# Patient Record
Sex: MALE | Race: WHITE | Employment: UNEMPLOYED | RURAL
[De-identification: names, ages, dates, MRNs, and addresses within clinical notes are randomized per-mention and may not be internally consistent; named-entity substitution may affect disease eponyms.]

---

## 2018-04-16 ENCOUNTER — HOSPITAL ENCOUNTER (OUTPATIENT)
Dept: OTHER | Age: 48
Discharge: HOME | End: 2018-04-16
Attending: NURSE PRACTITIONER | Admitting: NURSE PRACTITIONER

## 2018-04-16 LAB
A/G RATIO: 1.8 (ref 0.8–2)
ALBUMIN SERPL-MCNC: 4.8 G/DL (ref 3.4–4.8)
ALP BLD-CCNC: 81 U/L (ref 25–100)
ALT SERPL-CCNC: 19 U/L (ref 4–36)
ANION GAP SERPL CALCULATED.3IONS-SCNC: 13 MMOL/L (ref 3–16)
AST SERPL-CCNC: 19 U/L (ref 8–33)
BASOPHILS ABSOLUTE: 0.1 K/UL (ref 0–0.1)
BASOPHILS RELATIVE PERCENT: 0.9 %
BILIRUB SERPL-MCNC: <0.2 MG/DL (ref 0.3–1.2)
BUN BLDV-MCNC: 19 MG/DL (ref 6–20)
CALCIUM SERPL-MCNC: 9.6 MG/DL (ref 8.5–10.5)
CHLORIDE BLD-SCNC: 101 MMOL/L (ref 98–107)
CHOLESTEROL, TOTAL: 227 MG/DL (ref 0–200)
CO2: 28 MMOL/L (ref 20–30)
CREAT SERPL-MCNC: 0.9 MG/DL (ref 0.4–1.2)
EOSINOPHILS ABSOLUTE: 0.1 K/UL (ref 0–0.4)
EOSINOPHILS RELATIVE PERCENT: 1.7 %
GFR AFRICAN AMERICAN: >59
GFR NON-AFRICAN AMERICAN: >59
GLOBULIN: 2.7 G/DL
GLUCOSE BLD-MCNC: 116 MG/DL (ref 74–106)
HAV IGM SER IA-ACNC: NORMAL
HCT VFR BLD CALC: 46.6 % (ref 40–54)
HDLC SERPL-MCNC: 41 MG/DL (ref 40–60)
HEMOGLOBIN: 14.9 G/DL (ref 13–18)
HEPATITIS B CORE IGM ANTIBODY: NORMAL
HEPATITIS B SURFACE ANTIGEN INTERPRETATION: NORMAL
HEPATITIS C ANTIBODY INTERPRETATION: NORMAL
IMMATURE GRANULOCYTES #: 0.2 K/UL
IMMATURE GRANULOCYTES %: 2 % (ref 0–5)
LDL CHOLESTEROL CALCULATED: 163 MG/DL
LYMPHOCYTES ABSOLUTE: 2 K/UL (ref 1.5–4)
LYMPHOCYTES RELATIVE PERCENT: 25.8 %
MCH RBC QN AUTO: 29.9 PG (ref 27–32)
MCHC RBC AUTO-ENTMCNC: 32 G/DL (ref 31–35)
MCV RBC AUTO: 93.4 FL (ref 80–100)
MONOCYTES ABSOLUTE: 0.6 K/UL (ref 0.2–0.8)
MONOCYTES RELATIVE PERCENT: 8.2 %
NEUTROPHILS ABSOLUTE: 4.7 K/UL (ref 2–7.5)
NEUTROPHILS RELATIVE PERCENT: 61.4 %
PDW BLD-RTO: 12.8 % (ref 11–16)
PLATELET # BLD: 332 K/UL (ref 150–400)
PMV BLD AUTO: 10.7 FL (ref 6–10)
POTASSIUM SERPL-SCNC: 4.8 MMOL/L (ref 3.4–5.1)
RBC # BLD: 4.99 M/UL (ref 4.5–6)
SODIUM BLD-SCNC: 142 MMOL/L (ref 136–145)
TOTAL PROTEIN: 7.5 G/DL (ref 6.4–8.3)
TRIGL SERPL-MCNC: 117 MG/DL (ref 0–249)
VLDLC SERPL CALC-MCNC: 23 MG/DL
WBC # BLD: 7.7 K/UL (ref 4–11)

## 2018-11-13 ENCOUNTER — HOSPITAL ENCOUNTER (OUTPATIENT)
Facility: HOSPITAL | Age: 48
Discharge: HOME | End: 2018-11-13
Payer: MEDICAID

## 2018-11-13 ENCOUNTER — HOSPITAL ENCOUNTER (OUTPATIENT)
Dept: GENERAL RADIOLOGY | Facility: HOSPITAL | Age: 48
Discharge: HOME | End: 2018-11-13
Payer: MEDICAID

## 2018-11-13 DIAGNOSIS — M79.671 RIGHT FOOT PAIN: ICD-10-CM

## 2018-11-13 PROCEDURE — 73630 X-RAY EXAM OF FOOT: CPT

## 2018-11-30 DIAGNOSIS — M25.571 ARTHRALGIA OF RIGHT FOOT: Primary | ICD-10-CM

## 2018-12-03 ENCOUNTER — OFFICE VISIT (OUTPATIENT)
Dept: ORTHOPEDIC SURGERY | Facility: CLINIC | Age: 48
End: 2018-12-03

## 2018-12-03 VITALS — WEIGHT: 210 LBS | HEIGHT: 72 IN | BODY MASS INDEX: 28.44 KG/M2 | RESPIRATION RATE: 18 BRPM

## 2018-12-03 DIAGNOSIS — M20.21 HALLUX RIGIDUS OF RIGHT FOOT: Primary | ICD-10-CM

## 2018-12-03 PROCEDURE — 99203 OFFICE O/P NEW LOW 30 MIN: CPT | Performed by: PODIATRIST

## 2018-12-03 RX ORDER — IBUPROFEN 800 MG/1
800 TABLET ORAL 3 TIMES DAILY
Qty: 90 TABLET | Refills: 0 | Status: SHIPPED | OUTPATIENT
Start: 2018-12-03 | End: 2020-09-01
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
## 2019-01-03 ENCOUNTER — OFFICE VISIT (OUTPATIENT)
Dept: ORTHOPEDIC SURGERY | Facility: CLINIC | Age: 49
End: 2019-01-03

## 2019-01-03 VITALS — BODY MASS INDEX: 29.12 KG/M2 | WEIGHT: 215 LBS | RESPIRATION RATE: 18 BRPM | HEIGHT: 72 IN

## 2019-01-03 DIAGNOSIS — M20.21 HALLUX RIGIDUS OF RIGHT FOOT: Primary | ICD-10-CM

## 2019-01-03 DIAGNOSIS — M25.571 ARTHRALGIA OF RIGHT FOOT: ICD-10-CM

## 2019-01-03 PROCEDURE — 99213 OFFICE O/P EST LOW 20 MIN: CPT | Performed by: PODIATRIST

## 2019-01-03 NOTE — PROGRESS NOTES
Subjective   Patient ID: Alek Farrell is a 48 y.o. male   Follow-up of the Right Foot (right hallux limitus)  He comes back in today stating that he was doing pretty good but over the last few days he noticed some slight increase in pain.  He is only taking oral anti-inflammatories on occasion and states he stopped using his gel recently.  Says he's been walking around barefoot at some    History of Present Illness                                                   Review of Systems   Constitutional: Negative.    Respiratory: Negative.    Musculoskeletal: Positive for arthralgias (right foot).   Skin: Negative.        Past Medical History:   Diagnosis Date   • Fracture, foot     right foot        Past Surgical History:   Procedure Laterality Date   • CYST REMOVAL         No Known Allergies      Current Outpatient Medications:   •  diclofenac (VOLTAREN) 1 % gel gel, Apply 4 g topically to the appropriate area as directed 4 (Four) Times a Day., Disp: 100 g, Rfl: 1  •  ibuprofen (ADVIL,MOTRIN) 800 MG tablet, Take 1 tablet by mouth 3 (Three) Times a Day., Disp: 90 tablet, Rfl: 0    Family History   Problem Relation Age of Onset   • Diabetes Brother        Social History     Socioeconomic History   • Marital status: Single     Spouse name: Not on file   • Number of children: Not on file   • Years of education: Not on file   • Highest education level: Not on file   Social Needs   • Financial resource strain: Not on file   • Food insecurity - worry: Not on file   • Food insecurity - inability: Not on file   • Transportation needs - medical: Not on file   • Transportation needs - non-medical: Not on file   Occupational History   • Not on file   Tobacco Use   • Smoking status: Never Smoker   • Smokeless tobacco: Current User     Types: Chew   Substance and Sexual Activity   • Alcohol use: No     Frequency: Never   • Drug use: No   • Sexual activity: Defer   Other Topics Concern   • Not on file   Social History Narrative    • Not on file       Ready to quit: No  Counseling given: Yes       I have reviewed all of the above social hx, family hx, surgical hx, medications, allergies & ROS and confirm that it is accurate.  Objective   Physical Exam  Ortho Exam  Ortho Exam right  Lower extremity exam:  Vascular: Pulses palpable, pedal hair noted, CFT brisk, no edema noted  Neuro: Gross sensation intact  Derm: Normal turgor and temperature, no wounds or sores or lesions  MSK: There is tenderness to palpation to the right first metatarsal plantar joint.  There is a bony ridge and prominence noted dorsally and medially.  There is pain with end range of motion dorsiflexion.  Unloaded there is roughly 30° of dorsiflexion.  With the foot loaded this decreases to about 15.    Assessment/Plan right hallux limitus  Independent Review of Radiographic Studies:      Laboratory and Other Studies:     Medical Decision Making:        Procedures  [] No procedures were performed in office today.    There are no diagnoses linked to this encounter.      Recommendations/Plan:  We discussed continuing good supportive shoe gear and not going barefooted.  I recommend he continue the topical gel.  I will check on getting him set up for custom orthotics.  Hopefully these can be ordered in the near future.  We discussed injections.  He wants to get back to running and I'm okay with him trying that I recommend he wear good shoes and uses medication.  I'll notify him as soon as we find out about the orthotics.    No Follow-up on file.  Patient agreeable to call or return sooner for any concerns.

## 2020-01-30 ENCOUNTER — HOSPITAL ENCOUNTER (OUTPATIENT)
Facility: HOSPITAL | Age: 50
Discharge: HOME | End: 2020-01-30
Payer: MEDICAID

## 2020-01-30 PROCEDURE — 87591 N.GONORRHOEAE DNA AMP PROB: CPT

## 2020-01-30 PROCEDURE — 87491 CHLMYD TRACH DNA AMP PROBE: CPT

## 2020-01-31 LAB
C. TRACHOMATIS DNA ,URINE: NEGATIVE
N. GONORRHOEAE DNA, URINE: NEGATIVE

## 2020-07-23 ENCOUNTER — OFFICE VISIT (OUTPATIENT)
Dept: SURGERY | Facility: CLINIC | Age: 50
End: 2020-07-23

## 2020-07-23 VITALS
SYSTOLIC BLOOD PRESSURE: 138 MMHG | HEART RATE: 78 BPM | DIASTOLIC BLOOD PRESSURE: 88 MMHG | TEMPERATURE: 97.7 F | WEIGHT: 190.6 LBS | HEIGHT: 72 IN | OXYGEN SATURATION: 98 % | BODY MASS INDEX: 25.81 KG/M2

## 2020-07-23 DIAGNOSIS — K42.9 UMBILICAL HERNIA WITHOUT OBSTRUCTION AND WITHOUT GANGRENE: Primary | ICD-10-CM

## 2020-07-23 PROCEDURE — 99204 OFFICE O/P NEW MOD 45 MIN: CPT | Performed by: SURGERY

## 2020-07-23 RX ORDER — MELOXICAM 7.5 MG/1
7.5 TABLET ORAL DAILY
COMMUNITY

## 2020-07-23 RX ORDER — SODIUM CHLORIDE 0.9 % (FLUSH) 0.9 %
3 SYRINGE (ML) INJECTION EVERY 12 HOURS SCHEDULED
Status: CANCELLED | OUTPATIENT
Start: 2020-07-23

## 2020-07-23 RX ORDER — HEPARIN SODIUM 5000 [USP'U]/ML
5000 INJECTION, SOLUTION INTRAVENOUS; SUBCUTANEOUS ONCE
Status: CANCELLED | OUTPATIENT
Start: 2020-07-23

## 2020-07-23 RX ORDER — SODIUM CHLORIDE, SODIUM LACTATE, POTASSIUM CHLORIDE, CALCIUM CHLORIDE 600; 310; 30; 20 MG/100ML; MG/100ML; MG/100ML; MG/100ML
50 INJECTION, SOLUTION INTRAVENOUS CONTINUOUS
Status: CANCELLED | OUTPATIENT
Start: 2020-07-23

## 2020-07-23 RX ORDER — SODIUM CHLORIDE 0.9 % (FLUSH) 0.9 %
10 SYRINGE (ML) INJECTION AS NEEDED
Status: CANCELLED | OUTPATIENT
Start: 2020-07-23

## 2020-08-26 DIAGNOSIS — Z01.818 PREOPERATIVE TESTING: Primary | ICD-10-CM

## 2020-08-26 PROBLEM — K42.9 UMBILICAL HERNIA WITHOUT OBSTRUCTION AND WITHOUT GANGRENE: Status: ACTIVE | Noted: 2020-08-26

## 2020-09-01 ENCOUNTER — APPOINTMENT (OUTPATIENT)
Dept: PREADMISSION TESTING | Facility: HOSPITAL | Age: 50
End: 2020-09-01

## 2020-09-01 VITALS — WEIGHT: 179.13 LBS | BODY MASS INDEX: 24.26 KG/M2 | HEIGHT: 72 IN

## 2020-09-01 DIAGNOSIS — Z01.818 PREOPERATIVE TESTING: ICD-10-CM

## 2020-09-01 LAB
ANION GAP SERPL CALCULATED.3IONS-SCNC: 9.1 MMOL/L (ref 5–15)
BUN SERPL-MCNC: 13 MG/DL (ref 6–20)
BUN/CREAT SERPL: 14 (ref 7–25)
CALCIUM SPEC-SCNC: 9.9 MG/DL (ref 8.6–10.5)
CHLORIDE SERPL-SCNC: 105 MMOL/L (ref 98–107)
CO2 SERPL-SCNC: 26.9 MMOL/L (ref 22–29)
CREAT SERPL-MCNC: 0.93 MG/DL (ref 0.76–1.27)
DEPRECATED RDW RBC AUTO: 42.2 FL (ref 37–54)
ERYTHROCYTE [DISTWIDTH] IN BLOOD BY AUTOMATED COUNT: 12.7 % (ref 12.3–15.4)
GFR SERPL CREATININE-BSD FRML MDRD: 86 ML/MIN/1.73
GLUCOSE SERPL-MCNC: 107 MG/DL (ref 65–99)
HCT VFR BLD AUTO: 42.1 % (ref 37.5–51)
HGB BLD-MCNC: 14.4 G/DL (ref 13–17.7)
MCH RBC QN AUTO: 30.8 PG (ref 26.6–33)
MCHC RBC AUTO-ENTMCNC: 34.2 G/DL (ref 31.5–35.7)
MCV RBC AUTO: 90.1 FL (ref 79–97)
PLATELET # BLD AUTO: 321 10*3/MM3 (ref 140–450)
PMV BLD AUTO: 10.9 FL (ref 6–12)
POTASSIUM SERPL-SCNC: 4.6 MMOL/L (ref 3.5–5.2)
RBC # BLD AUTO: 4.67 10*6/MM3 (ref 4.14–5.8)
SODIUM SERPL-SCNC: 141 MMOL/L (ref 136–145)
WBC # BLD AUTO: 11.88 10*3/MM3 (ref 3.4–10.8)

## 2020-09-01 PROCEDURE — 85027 COMPLETE CBC AUTOMATED: CPT | Performed by: SURGERY

## 2020-09-01 PROCEDURE — 36415 COLL VENOUS BLD VENIPUNCTURE: CPT

## 2020-09-01 PROCEDURE — 80048 BASIC METABOLIC PNL TOTAL CA: CPT | Performed by: SURGERY

## 2020-09-01 PROCEDURE — U0004 COV-19 TEST NON-CDC HGH THRU: HCPCS | Performed by: SURGERY

## 2020-09-01 PROCEDURE — U0002 COVID-19 LAB TEST NON-CDC: HCPCS | Performed by: SURGERY

## 2020-09-01 PROCEDURE — C9803 HOPD COVID-19 SPEC COLLECT: HCPCS

## 2020-09-02 LAB
REF LAB TEST METHOD: NORMAL
SARS-COV-2 RNA RESP QL NAA+PROBE: NOT DETECTED

## 2020-09-04 ENCOUNTER — ANESTHESIA EVENT (OUTPATIENT)
Dept: PERIOP | Facility: HOSPITAL | Age: 50
End: 2020-09-04

## 2020-09-04 ENCOUNTER — HOSPITAL ENCOUNTER (OUTPATIENT)
Facility: HOSPITAL | Age: 50
Setting detail: HOSPITAL OUTPATIENT SURGERY
Discharge: HOME | End: 2020-09-04
Attending: SURGERY | Admitting: SURGERY

## 2020-09-04 ENCOUNTER — ANESTHESIA (OUTPATIENT)
Dept: PERIOP | Facility: HOSPITAL | Age: 50
End: 2020-09-04

## 2020-09-04 VITALS
SYSTOLIC BLOOD PRESSURE: 120 MMHG | RESPIRATION RATE: 17 BRPM | OXYGEN SATURATION: 99 % | DIASTOLIC BLOOD PRESSURE: 66 MMHG | TEMPERATURE: 98.2 F | HEART RATE: 62 BPM

## 2020-09-04 DIAGNOSIS — K42.9 UMBILICAL HERNIA WITHOUT OBSTRUCTION AND WITHOUT GANGRENE: ICD-10-CM

## 2020-09-04 PROCEDURE — G0463 HOSPITAL OUTPT CLINIC VISIT: HCPCS | Performed by: SURGERY

## 2020-09-04 PROCEDURE — 94799 UNLISTED PULMONARY SVC/PX: CPT

## 2020-09-04 RX ORDER — CEFAZOLIN SODIUM 2 G/50ML
2 SOLUTION INTRAVENOUS ONCE
Status: DISCONTINUED | OUTPATIENT
Start: 2020-09-04 | End: 2020-09-04 | Stop reason: HOSPADM

## 2020-09-04 RX ORDER — SODIUM CHLORIDE 0.9 % (FLUSH) 0.9 %
10 SYRINGE (ML) INJECTION AS NEEDED
Status: DISCONTINUED | OUTPATIENT
Start: 2020-09-04 | End: 2020-09-04 | Stop reason: HOSPADM

## 2020-09-04 RX ORDER — HEPARIN SODIUM 5000 [USP'U]/ML
5000 INJECTION, SOLUTION INTRAVENOUS; SUBCUTANEOUS ONCE
Status: DISCONTINUED | OUTPATIENT
Start: 2020-09-04 | End: 2020-09-04 | Stop reason: HOSPADM

## 2020-09-04 RX ORDER — SODIUM CHLORIDE, SODIUM LACTATE, POTASSIUM CHLORIDE, CALCIUM CHLORIDE 600; 310; 30; 20 MG/100ML; MG/100ML; MG/100ML; MG/100ML
50 INJECTION, SOLUTION INTRAVENOUS CONTINUOUS
Status: DISCONTINUED | OUTPATIENT
Start: 2020-09-04 | End: 2020-09-04 | Stop reason: HOSPADM

## 2020-09-04 RX ORDER — ACETAMINOPHEN 500 MG
1000 TABLET ORAL ONCE
Status: COMPLETED | OUTPATIENT
Start: 2020-09-04 | End: 2020-09-04

## 2020-09-04 RX ORDER — SODIUM CHLORIDE 0.9 % (FLUSH) 0.9 %
3 SYRINGE (ML) INJECTION EVERY 12 HOURS SCHEDULED
Status: DISCONTINUED | OUTPATIENT
Start: 2020-09-04 | End: 2020-09-04 | Stop reason: HOSPADM

## 2020-09-04 RX ADMIN — ACETAMINOPHEN 1000 MG: 500 TABLET, FILM COATED ORAL at 11:55

## 2020-09-04 NOTE — ANESTHESIA PREPROCEDURE EVALUATION
Anesthesia Evaluation     Patient summary reviewed and Nursing notes reviewed   no history of anesthetic complications:  NPO Solid Status: > 8 hours  NPO Liquid Status: > 8 hours           Airway   Mallampati: II  TM distance: >3 FB  Neck ROM: full  No difficulty expected  Dental - normal exam     Pulmonary - normal exam   (+) a smoker Current,     ROS comment: VAPES THC product daily    Snores, no JAMES workup.   Cardiovascular - negative cardio ROS and normal exam  Exercise tolerance: good (4-7 METS)      ROS comment: No ECG for review.     Neuro/Psych  (+) headaches, psychiatric history Depression,     GI/Hepatic/Renal/Endo - negative ROS     Musculoskeletal     Abdominal  - normal exam   Substance History - negative use     OB/GYN negative ob/gyn ROS         Other   arthritis,      ROS/Med Hx Other: Diarrhea                  Anesthesia Plan    ASA 2     general   (Risks and benefits of general anesthesia discussed with patient, including, aspiration, recall, dental damage, cardiac or respiratory compromise, stroke, fluctuations in blood pressure, seizure or death.     Pt advised that a endotracheal tube (ETT), laryngeal mask airway (LMA) or mask would be utilized to maintain the airway. Pt verbalized understanding and agreed to plan.)  intravenous induction     Anesthetic plan, all risks, benefits, and alternatives have been provided, discussed and informed consent has been obtained with: patient.    Plan discussed with CRNA.

## 2020-09-09 ENCOUNTER — TELEPHONE (OUTPATIENT)
Dept: SURGERY | Facility: CLINIC | Age: 50
End: 2020-09-09

## 2020-09-09 NOTE — TELEPHONE ENCOUNTER
Patient stated he did not wish to have to surgery at this time and would call back if he changed his mind.

## 2022-10-17 ENCOUNTER — OFFICE VISIT (OUTPATIENT)
Dept: UROLOGY | Facility: CLINIC | Age: 52
End: 2022-10-17

## 2022-10-17 ENCOUNTER — LAB (OUTPATIENT)
Dept: LAB | Facility: HOSPITAL | Age: 52
End: 2022-10-17

## 2022-10-17 ENCOUNTER — PATIENT ROUNDING (BHMG ONLY) (OUTPATIENT)
Dept: UROLOGY | Facility: CLINIC | Age: 52
End: 2022-10-17

## 2022-10-17 VITALS
SYSTOLIC BLOOD PRESSURE: 112 MMHG | RESPIRATION RATE: 18 BRPM | HEIGHT: 72 IN | WEIGHT: 190 LBS | OXYGEN SATURATION: 95 % | HEART RATE: 65 BPM | TEMPERATURE: 97.8 F | DIASTOLIC BLOOD PRESSURE: 58 MMHG | BODY MASS INDEX: 25.73 KG/M2

## 2022-10-17 DIAGNOSIS — N63.42 SUBAREOLAR MASS OF LEFT BREAST: ICD-10-CM

## 2022-10-17 DIAGNOSIS — N46.9 INFERTILITY MALE: Primary | ICD-10-CM

## 2022-10-17 DIAGNOSIS — N62 GYNECOMASTIA: ICD-10-CM

## 2022-10-17 DIAGNOSIS — N46.9 INFERTILITY MALE: ICD-10-CM

## 2022-10-17 PROCEDURE — 99204 OFFICE O/P NEW MOD 45 MIN: CPT | Performed by: UROLOGY

## 2022-10-17 PROCEDURE — 83001 ASSAY OF GONADOTROPIN (FSH): CPT

## 2022-10-17 PROCEDURE — 83002 ASSAY OF GONADOTROPIN (LH): CPT

## 2022-10-17 PROCEDURE — 36415 COLL VENOUS BLD VENIPUNCTURE: CPT

## 2022-10-17 PROCEDURE — 84146 ASSAY OF PROLACTIN: CPT

## 2022-10-17 NOTE — PROGRESS NOTES
"Chief Complaint  Infertility    HPI  Mr. Alek Farrell is a 52 y.o. gentleman who presents to the clinic today due to infertility.  He and his wife have been attempting conception for 12 months.  He has not impregnated a partner in the past.    Sexual method  Neither use birth control and they do not use commercial lubricants.  [place] frequency: approximately 7 times per week when ovulating.    Partner  His partner is 44 years old and she has previously been pregnant.   She has  chronic medical conditions. Anemia  She does not have regular menses.  She has not undergone a fertility work up yet    Fertility-related history  No other surgeries, h/o radiation or childhood illnesses.      He does have a history of umbilical hernia, states he left the day of surgery due to having to wait in the past.    Past Medical History  Past Medical History:   Diagnosis Date   • Arthritis     Reported most bothersome in toes and fingers   • Diarrhea    • Fracture, foot     right foot   • Headache    • History of depression 09/01/2020    Patient reproted \"years ago\" and denied any current issue with depression. Patient denied any suicidal ideation.    • History of fracture     Patient reported hx of right forearm, multiple toes/fingers, ribs   • Snores     Patient reported he feels questionable sleep apnea but reported he has never had a sleep study   • Wears glasses     OTC glasses PRN for reading       Past Surgical History  Past Surgical History:   Procedure Laterality Date   • CLOSED REDUCTION HIP DISLOCATION Right    • CYST REMOVAL Right     chest wall   • FOOT SURGERY Right    • WISDOM TOOTH EXTRACTION      Patient reported \"I think all of them were cut out\"       Medications    Current Outpatient Medications:   •  meloxicam (MOBIC) 7.5 MG tablet, Take 7.5 mg by mouth Daily., Disp: , Rfl:     Allergies  No Known Allergies    Social History  Social History     Socioeconomic History   • Marital status: Single   Tobacco " "Use   • Smoking status: Never   • Smokeless tobacco: Current     Types: Snuff   • Tobacco comments:     Patient reported he vapes THC since Feb 2020 and that he uses uses snuff intermittently   Substance and Sexual Activity   • Alcohol use: Yes     Comment: Patient reported history history of ETOH abuse for apx. 5 years but reported no abuse in the past 24-25 years   • Drug use: Yes     Frequency: 7.0 times per week     Types: Marijuana     Comment: Patient reported daily use since Feb 2020   • Sexual activity: Defer       Family History  He has no family history fertility-related issues or cystic fibrosis.    Review of Systems  Constitutional: No fevers or chills  Skin: Negative for rash  Endocrine: No heat/cold intolerance   Cardiovascular: Negative for chest pain or dyspnea on exertion  Respiratory: Negative for shortness of breath or wheezing  Gastrointestinal: No nausea or vomiting  Genitourinary: Negative for current gross hematuria.  Musculoskeletal: No flank pain  Neurological:  Negative for frequent headaches or dizziness  Lymph/Heme: Negative for leg swelling or calf pain.    Physical Exam  Visit Vitals  /58 (BP Location: Right arm, Patient Position: Sitting, Cuff Size: Adult)   Pulse 65   Temp 97.8 °F (36.6 °C) (Temporal)   Resp 18   Ht 182.9 cm (72\")   Wt 86.2 kg (190 lb)   SpO2 95%   BMI 25.77 kg/m²     Constitutional: NAD, WDWN.   HEENT: NCAT. Conjunctivae normal.  MMM.    Cardiovascular: Regular rate.  Pulmonary/Chest: Respirations are even and non-labored bilaterally.  Small subareolar lump posterior to nipple on left breast with associated tenderness  Abdominal: Soft. No distension, tenderness, masses or guarding. No CVA tenderness. + umbilical hernia  Neurological: A + O x 3.  Cranial Nerves II-XII grossly intact. Normal gait.  Extremities: UMANG x 4, Warm. No clubbing.  No cyanosis.    Skin: Pink, warm and dry.  No rashes noted.  Psychiatric:  Normal mood and affect    Genitourinary  Penis: un " circumcised penis, glans normal, no penile discharge.  No rashes/lesions.  Meatus in normal position   Testes: Left: 25g, Right: 25g, no masses and normal consistency.  Vasa palpable bilaterally.  No clinically palpable varicocele with valsalva.    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 He has bilateral gynecomastia with unilateral left breast tenderness.  Reportedly had same issue in 90s and right breast with part of tissue removed, benign.      Plan  1.  Semen analysis, LH, FSH, Prolactin  2.  FU after in approximately 3 weeks  3.  Recommend he FU with Dr. Pisano for breast examination and discussion of evaluation/biopsy, as well as rescheduling umbilical hernia repair.    No follow-ups on file.    Ashwin Eaton MD

## 2022-10-18 LAB
FSH SERPL-ACNC: 3.63 MIU/ML
LH SERPL-ACNC: 2.19 MIU/ML
PROLACTIN SERPL-MCNC: 6.45 NG/ML (ref 4.04–15.2)

## 2022-10-21 NOTE — PROGRESS NOTES
October 21, 2022    Hello, may I speak with Alek Farrell? Left Message.    My name is Marycruz.    I am  with [facility] [place]  [address]  [address]  [facility] [place] [address] [phone].    Before we get started may I verify your date of birth? 1970    I am calling to officially welcome you to our practice and ask about your recent visit. Is this a good time to talk?     Tell me about your visit with us. What things went well?         We're always looking for ways to make our patients' experiences even better. Do you have recommendations on ways we may improve?      Overall were you satisfied with your first visit to our practice?        I appreciate you taking the time to speak with me today. Is there anything else I can do for you?       Thank you, and have a great day.

## 2022-11-04 ENCOUNTER — LAB (OUTPATIENT)
Dept: LAB | Facility: HOSPITAL | Age: 52
End: 2022-11-04

## 2022-11-04 DIAGNOSIS — N46.9 INFERTILITY MALE: ICD-10-CM

## 2022-11-04 PROCEDURE — 89320 SEMEN ANAL VOL/COUNT/MOT: CPT

## 2022-11-05 LAB
CHARACTER SMN: ABNORMAL
COLOR SMN: ABNORMAL
LIQUEFACTION TIME SMN: ABNORMAL MIN
PH SMN: 8 [PH]
SPECIMEN VOL SMN: 0.5 ML (ref 2–5)
SPERM # SMN: 4 MILLIONS/ML
SPERM MORPHOLOGY COMMENT: ABNORMAL
SPERM MOTILE NFR SMN: 10 % MOTILE (ref 50–100)
VISC SMN: ABNORMAL CP

## 2022-11-10 ENCOUNTER — OFFICE VISIT (OUTPATIENT)
Dept: UROLOGY | Facility: CLINIC | Age: 52
End: 2022-11-10

## 2022-11-10 VITALS
DIASTOLIC BLOOD PRESSURE: 76 MMHG | HEART RATE: 112 BPM | OXYGEN SATURATION: 99 % | BODY MASS INDEX: 25.73 KG/M2 | HEIGHT: 72 IN | SYSTOLIC BLOOD PRESSURE: 124 MMHG | WEIGHT: 190 LBS

## 2022-11-10 DIAGNOSIS — N46.9 INFERTILITY MALE: Primary | ICD-10-CM

## 2022-11-10 PROCEDURE — 99214 OFFICE O/P EST MOD 30 MIN: CPT | Performed by: UROLOGY

## 2022-11-10 RX ORDER — LEVOCARNITINE TARTRATE 250 MG
250 CAPSULE ORAL DAILY
Qty: 30 EACH | Refills: 11 | Status: SHIPPED | OUTPATIENT
Start: 2022-11-10

## 2022-11-10 RX ORDER — ANASTROZOLE 1 MG/1
1 TABLET ORAL
Qty: 25 TABLET | Refills: 0 | Status: SHIPPED | OUTPATIENT
Start: 2022-11-10 | End: 2023-11-10

## 2022-11-10 NOTE — PROGRESS NOTES
"Chief Complaint   Patient presents with   • Follow-up       F/u with semen analysis       HPI  Ms. Farrell is a 52 y.o. male with history below in assessment, who presents for follow up.     Past Medical History:   Diagnosis Date   • Arthritis     Reported most bothersome in toes and fingers   • Diarrhea    • Fracture, foot     right foot   • Headache    • History of depression 09/01/2020    Patient reproted \"years ago\" and denied any current issue with depression. Patient denied any suicidal ideation.    • History of fracture     Patient reported hx of right forearm, multiple toes/fingers, ribs   • Snores     Patient reported he feels questionable sleep apnea but reported he has never had a sleep study   • Wears glasses     OTC glasses PRN for reading       Past Surgical History:   Procedure Laterality Date   • CLOSED REDUCTION HIP DISLOCATION Right    • CYST REMOVAL Right     chest wall   • FOOT SURGERY Right    • WISDOM TOOTH EXTRACTION      Patient reported \"I think all of them were cut out\"         Current Outpatient Medications:   •  meloxicam (MOBIC) 7.5 MG tablet, Take 7.5 mg by mouth Daily., Disp: , Rfl:      Physical Exam  Visit Vitals  /76 (BP Location: Left arm, Patient Position: Sitting, Cuff Size: Adult)   Pulse 112   Ht 182.9 cm (72.01\")   Wt 86.2 kg (190 lb)   SpO2 99%   BMI 25.76 kg/m²       Labs  Brief Urine Lab Results     None          Lab Results   Component Value Date    GLUCOSE 107 (H) 09/01/2020    CALCIUM 9.9 09/01/2020     09/01/2020    K 4.6 09/01/2020    CO2 26.9 09/01/2020     09/01/2020    BUN 13 09/01/2020    CREATININE 0.93 09/01/2020    EGFRIFNONA 86 09/01/2020    BCR 14.0 09/01/2020    ANIONGAP 9.1 09/01/2020       Lab Results   Component Value Date    WBC 11.88 (H) 09/01/2020    HGB 14.4 09/01/2020    HCT 42.1 09/01/2020    MCV 90.1 09/01/2020     09/01/2020      Latest Reference Range & Units 11/04/22 12:12   Appearance  Hazy   Semen Color White, Britton  " White   Volume, Semen 2.0 - 5.0 mL 0.5 (L)   pH Seminal Fluid >7.0  8.0   Liquefaction Time Normal  Abnormal !   Sperm, Total Count >20.0 Millions/mL 4.0 (L)   Sperm Morphology  Normal=15%, Abnormal=85% (C)   Motility 50 - 100 % Motile 10 (L)   (L): Data is abnormally low  !: Data is abnormal  (C): Corrected      Latest Reference Range & Units 10/17/22 14:49   LH mIU/mL 2.19   FSH mIU/mL 3.63   Prolactin 4.04 - 15.20 ng/mL 6.45       No results found for: PSA        Radiographic Studies  No Images in the past 120 days found..      I have reviewed above labs and imaging.     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 He has bilateral gynecomastia with unilateral left breast tenderness.  Reportedly had same issue in 90s and right breast with part of tissue removed, benign.    Serum labs within normal limits.  Semen analysis demonstrates oligoasthenospermia, low volume ejaculate.    Plan  1.  Start Clomid and anastrozole  2.  FU in 6 mo with repeat semen analysis

## 2023-05-11 ENCOUNTER — LAB REQUISITION (OUTPATIENT)
Dept: LAB | Facility: HOSPITAL | Age: 53
End: 2023-05-11
Payer: COMMERCIAL

## 2023-05-11 DIAGNOSIS — N46.9 MALE INFERTILITY, UNSPECIFIED: ICD-10-CM

## 2023-05-11 LAB
CHARACTER SMN: NORMAL
COLOR SMN: ABNORMAL
LIQUEFACTION TIME SMN: NORMAL MIN
PH SMN: 8 [PH]
SPECIMEN VOL SMN: 1 ML (ref 2–5)
SPERM # SMN: 30.4 MILLIONS/ML
SPERM MORPHOLOGY COMMENT: ABNORMAL
SPERM MOTILE NFR SMN: 60 % MOTILE (ref 50–100)
VISC SMN: NORMAL CP

## 2023-05-11 PROCEDURE — 89320 SEMEN ANAL VOL/COUNT/MOT: CPT | Performed by: UROLOGY

## 2023-11-14 DIAGNOSIS — N46.9 INFERTILITY MALE: ICD-10-CM

## 2023-11-14 RX ORDER — TRIAMCINOLONE ACETONIDE 1 MG/G
CREAM TOPICAL
Qty: 10 TABLET | Refills: 11 | OUTPATIENT
Start: 2023-11-14

## (undated) DEVICE — UNDYED BRAIDED (POLYGLACTIN 910), SYNTHETIC ABSORBABLE SUTURE: Brand: COATED VICRYL

## (undated) DEVICE — SPNG GZ STRL 2S 4X4 12PLY

## (undated) DEVICE — SUT PROLN 0 MO6 30IN 8418H

## (undated) DEVICE — GLV SURG ULTRATOUCH BIOGEL/COAT PF LF SZ6 STRL

## (undated) DEVICE — SUT SILK 0 TIES 30IN A306H

## (undated) DEVICE — RICH MAJOR PROCEDURE: Brand: MEDLINE INDUSTRIES, INC.

## (undated) DEVICE — ADHS LIQ MASTISOL 2/3ML

## (undated) DEVICE — STRIP,CLOSURE,WOUND,MEDI-STRIP,1/2X4: Brand: MEDLINE

## (undated) DEVICE — DRSNG WND BORDR/ADHS NONADHR/GZ LF 4X4IN STRL

## (undated) DEVICE — SUT VIC 2/0 SH 27IN

## (undated) DEVICE — FLEXIBLE YANKAUER,MEDIUM TIP, NO VACUUM CONTROL: Brand: ARGYLE

## (undated) DEVICE — CUFF SCD HEMOFORCE SEQ CALF STD MD

## (undated) DEVICE — SUT VIC 3/0 SH 27IN J416H

## (undated) DEVICE — GLV SURG SENSICARE GREEN W/ALOE PF LF 6 STRL

## (undated) DEVICE — NDL HYPO ECLPS SFTY 22G 1 1/2IN

## (undated) DEVICE — SYR LL TP 10ML STRL